# Patient Record
Sex: MALE | Race: WHITE
[De-identification: names, ages, dates, MRNs, and addresses within clinical notes are randomized per-mention and may not be internally consistent; named-entity substitution may affect disease eponyms.]

---

## 2021-08-23 ENCOUNTER — HOSPITAL ENCOUNTER (OUTPATIENT)
Dept: HOSPITAL 63 - RAD | Age: 36
End: 2021-08-23
Attending: NURSE PRACTITIONER
Payer: COMMERCIAL

## 2021-08-23 DIAGNOSIS — M25.562: Primary | ICD-10-CM

## 2021-08-23 PROCEDURE — 73562 X-RAY EXAM OF KNEE 3: CPT

## 2021-08-23 NOTE — RAD
EXAM: Left knee, 3 views.



HISTORY: Pain.



COMPARISON: None.



FINDINGS: 3 views of the left knee are obtained. There is no fracture, dislocation or subluxation. Th
ere is no joint effusion.



IMPRESSION: No acute osseous finding.



Electronically signed by: Jolie Ortiz MD (8/23/2021 11:47 AM) [id number]